# Patient Record
Sex: FEMALE | Race: WHITE | HISPANIC OR LATINO | Employment: FULL TIME | URBAN - METROPOLITAN AREA
[De-identification: names, ages, dates, MRNs, and addresses within clinical notes are randomized per-mention and may not be internally consistent; named-entity substitution may affect disease eponyms.]

---

## 2022-09-26 ENCOUNTER — OFFICE VISIT (OUTPATIENT)
Dept: INTERNAL MEDICINE | Facility: CLINIC | Age: 27
End: 2022-09-26
Payer: COMMERCIAL

## 2022-09-26 VITALS
DIASTOLIC BLOOD PRESSURE: 77 MMHG | SYSTOLIC BLOOD PRESSURE: 113 MMHG | BODY MASS INDEX: 32.3 KG/M2 | WEIGHT: 171.1 LBS | HEIGHT: 61 IN | OXYGEN SATURATION: 94 % | HEART RATE: 100 BPM

## 2022-09-26 DIAGNOSIS — G43.109 MIGRAINE WITH AURA AND WITHOUT STATUS MIGRAINOSUS, NOT INTRACTABLE: ICD-10-CM

## 2022-09-26 DIAGNOSIS — K21.9 GASTROESOPHAGEAL REFLUX DISEASE, UNSPECIFIED WHETHER ESOPHAGITIS PRESENT: ICD-10-CM

## 2022-09-26 DIAGNOSIS — F32.81 PMDD (PREMENSTRUAL DYSPHORIC DISORDER): ICD-10-CM

## 2022-09-26 DIAGNOSIS — F41.1 GAD (GENERALIZED ANXIETY DISORDER): Primary | ICD-10-CM

## 2022-09-26 DIAGNOSIS — F50.819 BINGE EATING DISORDER: ICD-10-CM

## 2022-09-26 PROCEDURE — 99204 OFFICE O/P NEW MOD 45 MIN: CPT | Performed by: INTERNAL MEDICINE

## 2022-09-26 RX ORDER — METRONIDAZOLE 7.5 MG/G
GEL VAGINAL
COMMUNITY
Start: 2022-02-01 | End: 2022-10-04

## 2022-09-26 RX ORDER — KETOPROFEN 25 MG/1
25 CAPSULE ORAL DAILY PRN
Qty: 30 CAPSULE | Refills: 1 | Status: SHIPPED | OUTPATIENT
Start: 2022-09-26 | End: 2022-09-27

## 2022-09-26 RX ORDER — VALACYCLOVIR HYDROCHLORIDE 500 MG/1
TABLET, FILM COATED ORAL
COMMUNITY
Start: 2022-02-15 | End: 2022-10-04

## 2022-09-26 RX ORDER — LAMOTRIGINE 50 MG/1
50 TABLET, EXTENDED RELEASE ORAL DAILY
COMMUNITY
End: 2022-10-04 | Stop reason: DRUGHIGH

## 2022-09-26 RX ORDER — MIRTAZAPINE 15 MG/1
TABLET, FILM COATED ORAL
COMMUNITY
Start: 2022-08-03 | End: 2022-09-26

## 2022-09-26 ASSESSMENT — ANXIETY QUESTIONNAIRES
5. BEING SO RESTLESS THAT IT IS HARD TO SIT STILL: NOT AT ALL
6. BECOMING EASILY ANNOYED OR IRRITABLE: NOT AT ALL
3. WORRYING TOO MUCH ABOUT DIFFERENT THINGS: SEVERAL DAYS
2. NOT BEING ABLE TO STOP OR CONTROL WORRYING: SEVERAL DAYS
GAD7 TOTAL SCORE: 3
IF YOU CHECKED OFF ANY PROBLEMS ON THIS QUESTIONNAIRE, HOW DIFFICULT HAVE THESE PROBLEMS MADE IT FOR YOU TO DO YOUR WORK, TAKE CARE OF THINGS AT HOME, OR GET ALONG WITH OTHER PEOPLE: NOT DIFFICULT AT ALL
GAD7 TOTAL SCORE: 3
1. FEELING NERVOUS, ANXIOUS, OR ON EDGE: SEVERAL DAYS
7. FEELING AFRAID AS IF SOMETHING AWFUL MIGHT HAPPEN: NOT AT ALL

## 2022-09-26 ASSESSMENT — ENCOUNTER SYMPTOMS
BLOATING: 1
PANIC: 0
DECREASED CONCENTRATION: 0
CONSTIPATION: 0
NAUSEA: 0
INSOMNIA: 0
RECTAL PAIN: 0
JAUNDICE: 0
VOMITING: 0
DEPRESSION: 0
ABDOMINAL PAIN: 0
NERVOUS/ANXIOUS: 1
BOWEL INCONTINENCE: 0
HEARTBURN: 0
DIARRHEA: 1
BLOOD IN STOOL: 0

## 2022-09-26 ASSESSMENT — PATIENT HEALTH QUESTIONNAIRE - PHQ9
SUM OF ALL RESPONSES TO PHQ QUESTIONS 1-9: 5
5. POOR APPETITE OR OVEREATING: NOT AT ALL

## 2022-09-26 NOTE — PROGRESS NOTES
"menta  {PROVIDER CHARTING PREFERENCE:863776}    Emma Marin is a 27 year old, presenting for the following health issues:  Establish Care (Pt left thumb nail was damaged about 2 months ago, pt would like it looked at. Pt would also like to discuss eating habits ), Headache, Nausea, and Anxiety      HPI     Left thumb nail injury:    Eating:           Anxiety:        {SUPERLIST (Optional):202200}  {additonal problems for provider to add (Optional):357513}    Review of Systems   {ROS COMP (Optional):172888}      Objective    /77 (BP Location: Right arm, Patient Position: Sitting, Cuff Size: Adult Regular)   Pulse 100   Ht 1.549 m (5' 1\")   Wt 77.6 kg (171 lb 1.6 oz)   LMP 09/12/2022 (Exact Date)   SpO2 94%   BMI 32.33 kg/m    Body mass index is 32.33 kg/m .  Physical Exam   {Exam List (Optional):887959}    {Diagnostic Test Results (Optional):632678}    {AMBULATORY ATTESTATION (Optional):549656}             "
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
## 2022-09-26 NOTE — NURSING NOTE
"Tomas Chris is a 27 year old female patient that presents today in clinic for the following:    Chief Complaint   Patient presents with     Establish Care     Pt left thumb nail was damaged about 2 months ago, pt would like it looked at. Pt would also like to discuss eating habits      Headache     Nausea     Anxiety     The patient's allergies and medications were reviewed as noted. A set of vitals were recorded as noted without incident: /77 (BP Location: Right arm, Patient Position: Sitting, Cuff Size: Adult Regular)   Pulse 100   Ht 1.549 m (5' 1\")   Wt 77.6 kg (171 lb 1.6 oz)   LMP 09/12/2022 (Exact Date)   SpO2 94%   BMI 32.33 kg/m  . The patient does not have any other questions for the provider.    Thelma Olivera, EMT at 4:32 PM on 9/26/2022    "

## 2022-09-27 DIAGNOSIS — G43.109 MIGRAINE WITH AURA AND WITHOUT STATUS MIGRAINOSUS, NOT INTRACTABLE: ICD-10-CM

## 2022-09-27 RX ORDER — KETOPROFEN 75 MG/1
75 CAPSULE ORAL DAILY PRN
Qty: 30 CAPSULE | Refills: 1 | Status: SHIPPED | OUTPATIENT
Start: 2022-09-27 | End: 2022-10-13

## 2022-09-27 NOTE — TELEPHONE ENCOUNTER
RN called patient and let her know Rx was sent to [facility] in [place].    Jaimie Balderas RN on 9/27/2022 at 5:05 PM

## 2022-09-27 NOTE — TELEPHONE ENCOUNTER
Prescription for the 75mg dose sent to [facility] since they have that in stock.    Thanks,  Jacky Muniz MD

## 2022-09-27 NOTE — TELEPHONE ENCOUNTER
[facility] called into the clinic.  They do not have ketoprofen on stock.  Unable to order.  Not sure if ketoprofen is still manufactured.  None available at any [facility].    Unable to reach provider yesterday via pager.          Gildardo Tamayo CMA ([facility]) at 1:36 PM on 9/27/2022

## 2022-10-03 ENCOUNTER — HEALTH MAINTENANCE LETTER (OUTPATIENT)
Age: 27
End: 2022-10-03

## 2022-10-04 ENCOUNTER — VIRTUAL VISIT (OUTPATIENT)
Dept: PSYCHIATRY | Facility: CLINIC | Age: 27
End: 2022-10-04
Attending: INTERNAL MEDICINE
Payer: COMMERCIAL

## 2022-10-04 DIAGNOSIS — F43.10 PTSD (POST-TRAUMATIC STRESS DISORDER): Primary | ICD-10-CM

## 2022-10-04 DIAGNOSIS — F32.81 PMDD (PREMENSTRUAL DYSPHORIC DISORDER): ICD-10-CM

## 2022-10-04 DIAGNOSIS — Z86.69 H/O MIGRAINE WITH AURA: ICD-10-CM

## 2022-10-04 PROCEDURE — 99205 OFFICE O/P NEW HI 60 MIN: CPT | Mod: 95 | Performed by: NURSE PRACTITIONER

## 2022-10-04 RX ORDER — MIRTAZAPINE 15 MG/1
TABLET, FILM COATED ORAL
COMMUNITY
Start: 2020-11-30 | End: 2022-10-04

## 2022-10-04 RX ORDER — LAMOTRIGINE 100 MG/1
100 TABLET ORAL DAILY
Qty: 30 TABLET | Refills: 1 | Status: SHIPPED | OUTPATIENT
Start: 2022-10-04 | End: 2023-02-16

## 2022-10-04 RX ORDER — MIRTAZAPINE 15 MG/1
7.5 TABLET, FILM COATED ORAL
Qty: 30 TABLET | Refills: 1 | Status: SHIPPED | OUTPATIENT
Start: 2022-10-04 | End: 2023-02-06

## 2022-10-04 RX ORDER — CLONIDINE HYDROCHLORIDE 0.1 MG/1
TABLET ORAL
Qty: 30 TABLET | Refills: 1 | Status: SHIPPED | OUTPATIENT
Start: 2022-10-04 | End: 2022-10-18 | Stop reason: SINTOL

## 2022-10-04 RX ORDER — LAMOTRIGINE 100 MG/1
TABLET ORAL
COMMUNITY
Start: 2022-09-14 | End: 2022-10-04

## 2022-10-04 ASSESSMENT — ENCOUNTER SYMPTOMS
EYES NEGATIVE: 1
NERVOUS/ANXIOUS: 1
MEMORY LOSS: 0
RESPIRATORY NEGATIVE: 1
HALLUCINATIONS: 0
INSOMNIA: 1
CARDIOVASCULAR NEGATIVE: 1
CONSTITUTIONAL NEGATIVE: 1
MUSCULOSKELETAL NEGATIVE: 1
DEPRESSION: 1
HEADACHES: 1
GASTROINTESTINAL NEGATIVE: 1

## 2022-10-04 ASSESSMENT — PATIENT HEALTH QUESTIONNAIRE - PHQ9
SUM OF ALL RESPONSES TO PHQ QUESTIONS 1-9: 6
SUM OF ALL RESPONSES TO PHQ QUESTIONS 1-9: 6
10. IF YOU CHECKED OFF ANY PROBLEMS, HOW DIFFICULT HAVE THESE PROBLEMS MADE IT FOR YOU TO DO YOUR WORK, TAKE CARE OF THINGS AT HOME, OR GET ALONG WITH OTHER PEOPLE: NOT DIFFICULT AT ALL

## 2022-10-04 ASSESSMENT — LIFESTYLE VARIABLES: SUBSTANCE_ABUSE: 0

## 2022-10-04 NOTE — Clinical Note
Dr. Muniz,  Thank you for referring Tomas to our [facility] ([facility]).   Patient attended her intake today.  The goal of [facility] is to be seen for approximately 3-5 visits to optimize medications for psychiatric symptoms before returning to your care for ongoing management of mental health.  If long-term psychiatric services are deemed more appropriate, a referral can be placed at that time.   Please see today's intake for my diagnostic impression and plan and let me know if you have any questions or concerns.  I will update you and the patient once Tomas is discharged from [facility].    Harpal,  Ute Muniz, APRN, CNP, PNP-PC, PMHNP-BC  [facility] ([facility])

## 2022-10-04 NOTE — Clinical Note
Please call patient to schedule followup appointment with me in 2 weeks.   Thank you,  Ute Muniz, APRN, CNP, PNP-PC, PMHNP-BC  [facility] ([facility])
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
## 2022-10-06 ENCOUNTER — TELEPHONE (OUTPATIENT)
Dept: PSYCHIATRY | Facility: CLINIC | Age: 27
End: 2022-10-06

## 2022-10-06 ENCOUNTER — TELEPHONE (OUTPATIENT)
Dept: INTERNAL MEDICINE | Facility: CLINIC | Age: 27
End: 2022-10-06

## 2022-10-07 NOTE — TELEPHONE ENCOUNTER
[employer] message sent to patient to ask if she has tried other meds in the past.    Jacky Muniz MD

## 2022-10-18 ENCOUNTER — VIRTUAL VISIT (OUTPATIENT)
Dept: PSYCHIATRY | Facility: CLINIC | Age: 27
End: 2022-10-18
Payer: COMMERCIAL

## 2022-10-18 ENCOUNTER — VIRTUAL VISIT (OUTPATIENT)
Dept: BEHAVIORAL HEALTH | Facility: CLINIC | Age: 27
End: 2022-10-18
Payer: COMMERCIAL

## 2022-10-18 DIAGNOSIS — F32.81 PMDD (PREMENSTRUAL DYSPHORIC DISORDER): ICD-10-CM

## 2022-10-18 DIAGNOSIS — F43.10 PTSD (POST-TRAUMATIC STRESS DISORDER): Primary | ICD-10-CM

## 2022-10-18 PROCEDURE — 99214 OFFICE O/P EST MOD 30 MIN: CPT | Mod: 95 | Performed by: NURSE PRACTITIONER

## 2022-10-18 PROCEDURE — 90832 PSYTX W PT 30 MINUTES: CPT | Mod: 95 | Performed by: COUNSELOR

## 2022-10-18 ASSESSMENT — ANXIETY QUESTIONNAIRES
GAD7 TOTAL SCORE: 2
7. FEELING AFRAID AS IF SOMETHING AWFUL MIGHT HAPPEN: NOT AT ALL
GAD7 TOTAL SCORE: 2
2. NOT BEING ABLE TO STOP OR CONTROL WORRYING: NOT AT ALL
3. WORRYING TOO MUCH ABOUT DIFFERENT THINGS: NOT AT ALL
7. FEELING AFRAID AS IF SOMETHING AWFUL MIGHT HAPPEN: NOT AT ALL
1. FEELING NERVOUS, ANXIOUS, OR ON EDGE: NOT AT ALL
8. IF YOU CHECKED OFF ANY PROBLEMS, HOW DIFFICULT HAVE THESE MADE IT FOR YOU TO DO YOUR WORK, TAKE CARE OF THINGS AT HOME, OR GET ALONG WITH OTHER PEOPLE?: VERY DIFFICULT
5. BEING SO RESTLESS THAT IT IS HARD TO SIT STILL: NOT AT ALL
4. TROUBLE RELAXING: MORE THAN HALF THE DAYS
6. BECOMING EASILY ANNOYED OR IRRITABLE: NOT AT ALL
IF YOU CHECKED OFF ANY PROBLEMS ON THIS QUESTIONNAIRE, HOW DIFFICULT HAVE THESE PROBLEMS MADE IT FOR YOU TO DO YOUR WORK, TAKE CARE OF THINGS AT HOME, OR GET ALONG WITH OTHER PEOPLE: VERY DIFFICULT
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
## 2022-10-18 NOTE — Clinical Note
Please call patient to schedule followup appointment with me in 4 weeks.    Thank you,   Ute Muniz, APRN, CNP, PNP-PC, PMHNP-BC  [facility] ([facility])

## 2022-10-18 NOTE — PROGRESS NOTES
[facility]  2022      Behavioral Health Clinician Progress Note    Patient Name: Tomas Chris           Service Type:  Individual      Service Location:   MyChart / Email (patient reached)     Session Start Time: 231pm  Session End Time: 256pm      Session Length: 16 - 37      Attendees: Patient     Service Modality:  Video Visit:      Provider verified identity through the following two step process.  Patient provided:  Patient  and Patient was verified at admission/transfer    Telemedicine Visit: The patient's condition can be safely assessed and treated via synchronous audio and visual telemedicine encounter.      Reason for Telemedicine Visit: Services only offered telehealth    Originating Site (Patient Location): Patient's home    Distant Site (Provider Location): Provider Remote Setting- Home Office    Consent:  The patient/guardian has verbally consented to: the potential risks and benefits of telemedicine (video visit) versus in person care; bill my insurance or make self-payment for services provided; and responsibility for payment of non-covered services.     Patient would like the video invitation sent by:  My Chart    Mode of Communication:  Video Conference via [facility]    Distant Location (Provider):  Off-site    As the provider I attest to compliance with applicable laws and regulations related to telemedicine.    Visit Activities (Refresh list every visit): [facility] Only    Diagnostic Assessment Date: 10/4/2022 Ute Muniz  Treatment Plan Review Date: 2022  See Flowsheets for today's PHQ-9 and JESUS-7 results  Previous PHQ-9:   PHQ-9 SCORE 2022 10/4/2022   PHQ-9 Total Score MyChart - 6 (Mild depression)   PHQ-9 Total Score 5 6     Previous JESUS-7:   JESUS-7 SCORE 2022 10/18/2022   Total Score - 2 (minimal anxiety)   Total Score 3 2       TRAE LEVEL:  No flowsheet data found.    DATA  Extended Session (60+ minutes):  No  Interactive Complexity: No  Crisis: No  [facility] Patient: No    Treatment Objective(s) Addressed in This Session:  Target Behavior(s): disease management/lifestyle changes trouble with sleep and low energy, and worry about family    Depressed Mood: Decrease frequency and intensity of feeling down, depressed, hopeless  Improve quantity and quality of night time sleep / decrease daytime naps  Feel less tired and more energy during the day   Improve diet, appetite, mindful eating, and / or meal planning  Improve concentration, focus, and mindfulness in daily activities   Anxiety: will experience a reduction in anxiety, will develop healthy cognitive patterns and beliefs and will increase ability to function adaptively  Psychological distress related to Sleep Disturbance    Current Stressors / Issues:   update: Pt reports that they started 100 mg of Lamotrigine and Clonidine. Pt was just started new medication and they took it throughout this time period. Pt has has insomnia as a child. It has become worse and pt has tried exercising or just not walking. Pt tried hot showers before bed and use lavender oil and reduce screen time, wear ear plugs and read before bed. Pt is getting 8 hours of sleep a week. Pt did not sleep at all yesterday. Took the lamotrigine in the 10am and didn't take the clonidine and were able to sleep better, this could be related to not getting sleep for a few nights. Pt missed work recently. Still miss parents.   Stressors: none  Side Effects: sleep is evasive, dry mouth and have Aisha things stuck to their lip, have to wake up to drink water- Clonidine   Mood: yesterday feel exhausted and had a melt down, cried told  they couldn't do it anymore   Appetite: haven't been eating a lot, been ordering a lot of take out since pt doesn't have the energy to cook   Sleep: almost been a month since they were able to sleep   Pregnancy: No  Impulsive spending/spending sprees: none  Suicidality: Pt  denies   Self-harm: Pt denies   Substance Use: Pt denies   Caffeine: Pt denies   Therapist: meeting with therapist this week and she is outside of [place] and is a massage psychologist    Interventions: [facility] recommends pt to ask questions about therapists experience and recommendations   Medication Questions/Requests: sleep, concern about things that trigger migraines, concern is lamotrigine can cause insomnia         Progress on Treatment Objective(s) / Homework:  Minimal progress - ACTION (Actively working towards change); Intervened by reinforcing change plan / affirming steps taken    Motivational Interviewing    MI Intervention: Co-Developed Goal: reduce anxiety and improve sleep, Expressed Empathy/Understanding, Supported Autonomy, Collaboration, Evocation, Permission to raise concern or advise, Open-ended questions, Reflections: simple and complex, Change talk (evoked) and Reframe     Change Talk Expressed by the Patient: Need to change Committment to change Taking steps    Provider Response to Change Talk: E - Evoked more info from patient about behavior change, A - Affirmed patient's thoughts, decisions, or attempts at behavior change, R - Reflected patient's change talk and S - Summarized patient's change talk statements    Also provided psychoeducation about behavioral health condition, symptoms, and treatment options    Care Plan review completed: No    Medication Review:  No changes to current psychiatric medication(s)    Medication Compliance:  Yes    Changes in Health Issues:   None reported    Chemical Use Review:   Substance Use: Chemical use reviewed, no active concerns identified      Tobacco Use: No current tobacco use.      Assessment: Current Emotional / Mental Status (status of significant symptoms):  Risk status (Self / Other harm or suicidal ideation)  Patient denies a history of suicidal ideation, suicide attempts, self-injurious behavior, homicidal ideation, homicidal behavior and and  other safety concerns  Patient denies current fears or concerns for personal safety.  Patient denies current or recent suicidal ideation or behaviors.  Patient denies current or recent homicidal ideation or behaviors.  Patient denies current or recent self injurious behavior or ideation.  Patient denies other safety concerns.  A safety and risk management plan has not been developed at this time, however patient was encouraged to call [place] [phone] should there be a change in any of these risk factors.    Appearance:   Appropriate   Eye Contact:   Good   Psychomotor Behavior: Normal   Attitude:   Cooperative  Interested Friendly  Orientation:   All  Speech   Rate / Production: Normal    Volume:  Normal   Mood:    Anxious  Sad   Affect:    Subdued  Worrisome   Thought Content:  Clear   Thought Form:  Coherent  Logical   Insight:    Good     Diagnoses:  1. PTSD (post-traumatic stress disorder)    2. PMDD (premenstrual dysphoric disorder)        Collateral Reports Completed:  Communicated with:   Ute Muniz, APRN, CNP, PNP-PC, PMHNP-BC     Plan: (Homework, other):  Patient was given information about behavioral services and encouraged to schedule a follow up appointment with the clinic [facility] [place] in 1 month.  She was also given information about mental health symptoms and treatment options .  CD Recommendations: No indications of CD issues.    EFRAIN Sharpe, [facility] 10/18/2022  ______________________________________________________________________    Integrated Primary Care Behavioral Health Treatment Plan    Patient's Name: Tomas Chris  YOB: 1995    Date of Creation: 10/18/2022  Date Treatment Plan Last Reviewed/Revised: 10/18/2022    DSM5 Diagnoses:   1. PTSD (post-traumatic stress disorder)    2. PMDD (premenstrual dysphoric disorder)      Psychosocial / Contextual Factors: works, parents and friends are still in [facility]  PROMIS (reviewed every 90 days):   PROMIS 10 [facility]  Score Global Physical Health Score   10/4/2022 13 15     PROMIS 10 PROMIS TOTAL - SUBSCORES   10/4/2022 28       Referral / Collaboration:  Referral to another professional/service is not indicated at this time. Pt is going to meet with a therapist soon and will report back about the connection and fit.     Anticipated number of session for this episode of care: 5-6  Anticipation frequency of session: Monthly  Anticipated Duration of each session: 16-37 minutes  Treatment plan will be reviewed in 90 days or when goals have been changed.       MeasurableTreatment Goal(s) related to diagnosis / functional impairment(s)  Goal 1: Patient will reduce anxiety and PTSD symptoms.     I will know I've met my goal when I am hopeful that I can find a therapist who will be right for me.      Objective #A (Patient Action)    Patient will find a therapist who is a good match and fit and will report this to [facility] .    Status: New - Date: 10/18/2022     Intervention(s)  Therapist will provide support and resources/future referrals as needed to locate a therapist who will be a good match personality wise and the theoretical orientation pt is looking for.     Patient has reviewed and agreed to the above plan.      Bernie Worthington, ANDREY  October 18, 2022

## 2023-01-06 ENCOUNTER — HOSPITAL ENCOUNTER (OUTPATIENT)
Dept: GENERAL RADIOLOGY | Facility: HOSPITAL | Age: 28
Discharge: HOME | End: 2023-01-06
Attending: STUDENT IN AN ORGANIZED HEALTH CARE EDUCATION/TRAINING PROGRAM | Admitting: STUDENT IN AN ORGANIZED HEALTH CARE EDUCATION/TRAINING PROGRAM
Payer: COMMERCIAL

## 2023-01-06 ENCOUNTER — OFFICE VISIT (OUTPATIENT)
Dept: FAMILY MEDICINE | Facility: CLINIC | Age: 28
End: 2023-01-06
Payer: COMMERCIAL

## 2023-01-06 VITALS
WEIGHT: 169.7 LBS | TEMPERATURE: 98.1 F | OXYGEN SATURATION: 99 % | DIASTOLIC BLOOD PRESSURE: 81 MMHG | BODY MASS INDEX: 32.06 KG/M2 | RESPIRATION RATE: 16 BRPM | SYSTOLIC BLOOD PRESSURE: 117 MMHG | HEART RATE: 81 BPM

## 2023-01-06 DIAGNOSIS — R07.81 PLEURITIC CHEST PAIN: ICD-10-CM

## 2023-01-06 DIAGNOSIS — M94.0 COSTOCHONDRITIS: ICD-10-CM

## 2023-01-06 DIAGNOSIS — R05.8 POST-VIRAL COUGH SYNDROME: Primary | ICD-10-CM

## 2023-01-06 LAB
ATRIAL RATE - MUSE: 84 BPM
DIASTOLIC BLOOD PRESSURE - MUSE: NORMAL MMHG
INTERPRETATION ECG - MUSE: NORMAL
P AXIS - MUSE: 14 DEGREES
PR INTERVAL - MUSE: 142 MS
QRS DURATION - MUSE: 100 MS
QT - MUSE: 398 MS
QTC - MUSE: 470 MS
R AXIS - MUSE: 4 DEGREES
SYSTOLIC BLOOD PRESSURE - MUSE: NORMAL MMHG
T AXIS - MUSE: 23 DEGREES
VENTRICULAR RATE- MUSE: 84 BPM

## 2023-01-06 PROCEDURE — 93005 ELECTROCARDIOGRAM TRACING: CPT | Performed by: STUDENT IN AN ORGANIZED HEALTH CARE EDUCATION/TRAINING PROGRAM

## 2023-01-06 PROCEDURE — 99213 OFFICE O/P EST LOW 20 MIN: CPT | Performed by: STUDENT IN AN ORGANIZED HEALTH CARE EDUCATION/TRAINING PROGRAM

## 2023-01-06 PROCEDURE — 93010 ELECTROCARDIOGRAM REPORT: CPT | Performed by: INTERNAL MEDICINE

## 2023-01-06 PROCEDURE — 71046 X-RAY EXAM CHEST 2 VIEWS: CPT

## 2023-01-06 RX ORDER — BENZONATATE 100 MG/1
100 CAPSULE ORAL 3 TIMES DAILY PRN
Qty: 30 CAPSULE | Refills: 0 | Status: SHIPPED | OUTPATIENT
Start: 2023-01-06 | End: 2023-01-16

## 2023-01-06 RX ORDER — ECHINACEA PURPUREA EXTRACT 125 MG
2 TABLET ORAL 3 TIMES DAILY PRN
Qty: 88 ML | Refills: 1 | Status: SHIPPED | OUTPATIENT
Start: 2023-01-06

## 2023-01-06 NOTE — PROGRESS NOTES
URGENT CARE VISIT:    SUBJECTIVE:   Tomas Chris is a 27 year old female presenting with a chief complaint of URI symptoms.  After Thanksgiving, she reports of generalized body aches, dry cough, chills, difficulty breathing when laying flat.   She tried Mucinex with 1.5 weeks with no improvement.   During first week of December, she reports of brown sticky phlegm.  She went to [place], [place], and [place] from December 25 to January 5.   She took 4 COVID tests and were negative.   Last week, she reports of decreased energy, sore throat.   She denies the following symptoms: fever  Course of illness is same.    Treatment measures tried include Tylenol/Ibuprofen, Fluids, Rest and Mucinex with no relief of symptoms.  Predisposing factors include None.  She reports of decreased appetite. She is voiding normally.  [place] was [place] She was raised in [facility]. [place].  She denies any exposure to anyone who tested positive for COVID, tuberculosis.    PMH:   Past Medical History:   Diagnosis Date     Episode of syncope      Allergies: Penicillins   Medications:   Current Outpatient Medications   Medication Sig Dispense Refill     benzonatate (TESSALON) 100 MG capsule Take 1 capsule (100 mg) by mouth 3 times daily as needed for cough 30 capsule 0     lamoTRIgine (LAMICTAL) 100 MG tablet Take 1 tablet (100 mg) by mouth daily 30 tablet 1     mirtazapine (REMERON) 15 MG tablet Take 0.5 tablets (7.5 mg) by mouth nightly as needed (sleep) 30 tablet 1     rizatriptan (MAXALT-MLT) 5 MG ODT Take 1 tablet (5 mg) by mouth at onset of headache for migraine May repeat in 2 hours. Max 6 tablets/24 hours. 18 tablet 3     sodium chloride (OCEAN) 0.65 % nasal spray Spray 2 sprays in nostril 3 times daily as needed for congestion 88 mL 1     Social History:   Social History     Tobacco Use     Smoking status: Never     Smokeless tobacco: Never   Substance Use Topics     Alcohol use: Not Currently     Comment:  "Drinks \"maybe a beer\" when they go out, but nothing excessive. Does not keep alcohol in the home.       ROS:  Review of systems negative except as stated above.    OBJECTIVE:  /81 (BP Location: Left arm, Patient Position: Sitting, Cuff Size: Adult Large)   Pulse 81   Temp 98.1  F (36.7  C) (Oral)   Resp 16   Wt 77 kg (169 lb 11.2 oz)   SpO2 99%   BMI 32.06 kg/m    GENERAL APPEARANCE: healthy, alert and no distress  EYES: EOMI,  PERRL, conjunctiva clear  HENT: ear canals and TM's normal.  Nose and mouth without ulcers, erythema or lesions  NECK: supple, nontender, no lymphadenopathy  RESP: lungs clear to auscultation - no rales, rhonchi or wheezes  CV: regular rates and rhythm, normal S1 S2, no murmur noted  CHEST: anterior chest wall pain is reproducible to palpation  SKIN: no suspicious lesions or rashes    Labs:    Results for orders placed or performed during the hospital encounter of 01/06/23   XR Chest 2 Views     Status: None    Narrative    EXAM: XR CHEST 2 VIEWS  LOCATION: [facility]  DATE/TIME: 1/6/2023 2:02 PM    INDICATION:  Pleuritic chest pain  COMPARISON: None.      Impression    IMPRESSION: Lungs are clear. No pleural effusion. No pneumothorax. Heart size and pulmonary vascularity within normal limits.   Results for orders placed or performed in visit on 01/06/23   EKG 12-lead, tracing only     Status: None   Result Value Ref Range    Systolic Blood Pressure  mmHg    Diastolic Blood Pressure  mmHg    Ventricular Rate 84 BPM    Atrial Rate 84 BPM    NE Interval 142 ms    QRS Duration 100 ms     ms    QTc 470 ms    P Axis 14 degrees    R AXIS 4 degrees    T Axis 23 degrees    Interpretation ECG       Sinus rhythm  Normal ECG  No previous ECGs available  Confirmed by SHELDON STERLING MD LOC:[facility] ([id number]) on 1/6/2023 2:11:43 PM         ASSESSMENT/PLAN:    ICD-10-CM    1. Post-viral cough syndrome  R05.8 sodium chloride (OCEAN) 0.65 % nasal spray     benzonatate " (TESSALON) 100 MG capsule      2. Pleuritic chest pain  R07.81 XR Chest 2 Views     EKG 12-lead, tracing only     CANCELED: EKG 12-lead complete w/read - Clinics      3. Costochondritis  M94.0       Etiology likely post viral cough with costochondritis. On exam, afebrile, hemodynamically stable on RA. On exam, chest wall pain reproducible to palpation otherwise cardiopulmonary exam normal. ECG and CXR normal. Discussed conservative management with fluids, nasal saline irrigation (neti pot), warm steamy shower, salt water gargles, cough suppressants, expectorants (Mucinex), decongestants (Pseudofed), and analgesics (Tylenol and/or Ibuprofen).    Patient verbalized understanding and is agreeable to plan. The patient was discharged ambulatory and in stable condition.    Shanta Rm MD ....................  1/6/2023   1:22 PM

## 2023-01-06 NOTE — PATIENT INSTRUCTIONS
Patient was educated on the natural course of post viral cough. Conservative measures discussed including increased fluids, nasal saline irrigation (neti pot), warm steamy shower, salt water gargles, cough suppressants, expectorants (Mucinex), decongestants (Pseudofed), and analgesics (Tylenol and/or Ibuprofen). See your primary care provider if symptoms worsen or do not improve in 5 days. Seek emergency care if you develop fever over 104 or shortness of breath.

## 2023-01-12 ENCOUNTER — OFFICE VISIT (OUTPATIENT)
Dept: INTERNAL MEDICINE | Facility: CLINIC | Age: 28
End: 2023-01-12
Payer: COMMERCIAL

## 2023-01-12 VITALS
DIASTOLIC BLOOD PRESSURE: 81 MMHG | OXYGEN SATURATION: 96 % | HEART RATE: 87 BPM | SYSTOLIC BLOOD PRESSURE: 121 MMHG | WEIGHT: 173.6 LBS | BODY MASS INDEX: 32.8 KG/M2 | TEMPERATURE: 98.2 F

## 2023-01-12 DIAGNOSIS — R05.8 POST-VIRAL COUGH SYNDROME: Primary | ICD-10-CM

## 2023-01-12 PROCEDURE — 99213 OFFICE O/P EST LOW 20 MIN: CPT | Mod: GC

## 2023-01-12 NOTE — PATIENT INSTRUCTIONS
your tessalon and start taking at night. Continue decongestants, nasal spray. Try a Neti-Pot to clear out congestion. Likely cause of cough is post-nasal drip.     If shortness of breath worsens, worsening chest pain, symptoms not resolving in 2-4 weeks, please return to clinic or let us know.

## 2023-01-12 NOTE — NURSING NOTE
Tomas Chris is a 27 year old female patient that presents today in clinic for the following:    Chief Complaint   Patient presents with     Chest Pain     Cough for over 5 weeks, chest pain and congestion per pt, not sleeping well due to coughing, dry cough      The patient's allergies and medications were reviewed as noted. A set of vitals were recorded as noted without incident: /81 (BP Location: Right arm, Patient Position: Sitting, Cuff Size: Adult Regular)   Pulse 87   Temp 98.2  F (36.8  C)   Wt 78.7 kg (173 lb 9.6 oz)   LMP 12/28/2022 (Approximate)   SpO2 96%   BMI 32.80 kg/m  . The patient does not have any other questions for the provider.    Thelma Olivera, EMT at 7:45 AM on 1/12/2023

## 2023-01-12 NOTE — PROGRESS NOTES
I, Clement Hong MD discussed with the resident during the visit, and agree with the resident's findings and plan of care as documented in the resident's note. I was immediately available to the patient should the need have arisen.  /81 (BP Location: Right arm, Patient Position: Sitting, Cuff Size: Adult Regular)   Pulse 87   Temp 98.2  F (36.8  C)   Wt 78.7 kg (173 lb 9.6 oz)   LMP 12/28/2022 (Approximate)   SpO2 96%   BMI 32.80 kg/m    I personally reviewed vital signs and past record.  Key findings: cough, recurrent - may be second episode. Rev'd CXR. Tender on sternal palpation.

## 2023-01-12 NOTE — PROGRESS NOTES
PRIMARY CARE CENTER       SUBJECTIVE:  Tomas Chris is a 27 year old female with a PMHx of anxiety, PTSD who comes in for cough.     She notes initial URI symptoms 11/27 with cough, congestion, body aches, chills.  She did take Augmentin for it.  After several weeks, symptoms improved.  She then had a 2-week trip to [place] and started feeling poorly 5 days after a trip, around 12/29.  She has taken many at home COVID test that have turned negative.  She notes that she has had brown, sticky phlegm and congestion.  Went to a clinic last week at which time chest x-ray and EKG was unremarkable.  She has tried saline spray nasal spray, Atrovent nebulizer from her  2-3 times a day without significant improvement, no fevers, chills, body aches, weight loss, night sweats, other allergy symptoms, tobacco use, vaping, other inhalants.  She feels that her coughing is due to her postnasal drip.  Does have chest pain associated with coughing located in the middle of her chest without radiation.  Has not tried Sudafed or [place] pot recommended last visit.    Family history of lung disease includes grandfather who had lung infection possible cancer.     Medications and allergies reviewed by me today.     ROS:   Full ROS negative unless mentioned in HPI.     OBJECTIVE:    /81 (BP Location: Right arm, Patient Position: Sitting, Cuff Size: Adult Regular)   Pulse 87   Temp 98.2  F (36.8  C)   Wt 78.7 kg (173 lb 9.6 oz)   LMP 12/28/2022 (Approximate)   SpO2 96%   BMI 32.80 kg/m     Wt Readings from Last 1 Encounters:   01/12/23 78.7 kg (173 lb 9.6 oz)       GENERAL APPEARANCE: healthy, alert and no distress     EYES: EOMI     HENT: inflamed turbinates bilateral, post-nasal drip noted in throat. No sinus tenderness      NECK: no adenopathy     RESP: lungs clear to auscultation - no rales, rhonchi or wheezes     CV: regular rates and rhythm, normal S1 S2, no S3 or S4 and no murmur, click or  rub     CHEST: anterior chest wall pain reproducible to palpation     ABDOMEN:  soft, non-distended     MS: extremities normal- no gross deformities noted     SKIN: no suspicious lesions or rashes on exposed skin     ASSESSMENT/PLAN:    Tomas was seen today for chest pain.    Diagnoses and all orders for this visit:    Post-viral cough syndrome vs. Acute bronchitis  Agree the etiology is still most likely postviral cough with costochondritis versus possible acute bronchitis with this recent exacerbation.  EKG and chest x-ray at last visit were unremarkable.  Given lack of systemic symptoms, hemodynamically stable, recommends continued conservative management, including using a [place] pot, decongestions like Sudafed, Tessalon at night.    Tomas verbalized understanding is agreeable to plan.  She should follow-up in clinic with me or her PCP in 1 month if symptoms continue to worsen.     Darrell Chávez MD  Jan 12, 2023    Pt was seen and plan of care discussed with Dr. Hong.

## 2023-02-03 DIAGNOSIS — F43.10 PTSD (POST-TRAUMATIC STRESS DISORDER): ICD-10-CM

## 2023-02-06 ENCOUNTER — MYC MEDICAL ADVICE (OUTPATIENT)
Dept: PSYCHIATRY | Facility: CLINIC | Age: 28
End: 2023-02-06
Payer: COMMERCIAL

## 2023-02-06 RX ORDER — MIRTAZAPINE 15 MG/1
TABLET, FILM COATED ORAL
Qty: 30 TABLET | Refills: 1 | Status: SHIPPED | OUTPATIENT
Start: 2023-02-06 | End: 2023-02-16

## 2023-02-06 NOTE — TELEPHONE ENCOUNTER
Date of Last Office Visit: 10/18/22  Date of Next Office Visit: None, will contact patient and A  No shows since last visit: 0  Cancellations since last visit: 11/18/22    Medication requested: mirtazapine (REMERON) 15 MG tablet Date last ordered: 10/4/22 Qty: 30 Refills: 0     Review of [place] ?: NA    Lapse in medication adherence greater than 5 days?: No  If yes, call patient and gather details: na  Medication refill request verified as identical to current order?: yes  Result of Last DAM, VPA, Li+ Level, CBC, or Carbamazepine Level (at or since last visit): N/A      Last visit treatment plan:     CONTINUE mirtazapine 7.5 mg at bedtime but okay to use only if needed for sleep.  If sleep improves, okay to stop mirtazapine altogether.    STOP clonidine due to increased insomnia and dry mouth.     CONTINUE lamotrigine 100 mg daily     Labs:  None placed today.  Consider:  CBC, TSH with free T4, and CMP    Recommend therapy.  Follow-up:  4 weeks or sooner if new or worsening symptoms    []Medication refilled per  Medication Refill in Ambulatory Care  policy.  [x]Medication unable to be refilled by RN due to criteria not met as indicated below:    []Eligibility - not seen in the last year   [x]Supervision - no future appointment   [x]Compliance - no shows, cancellations or lapse in therapy   []Verification - order discrepancy   []Controlled medication   [x]Medication not included in policy   []90-day supply request   []Other

## 2023-02-15 NOTE — PROGRESS NOTES
[facility] Care Psychiatry Services [facility]  2023      Behavioral Health Clinician Progress Note    Patient Name: Tomas Chris           Service Type:  Individual      Service Location:   MyChart / Email (patient reached)     Session Start Time: 200pm  Session End Time: 216pm      Session Length: 16 - 37      Attendees: Patient     Service Modality:  Video Visit:      Provider verified identity through the following two step process.  Patient provided:  Patient  and Patient was verified at admission/transfer    Telemedicine Visit: The patient's condition can be safely assessed and treated via synchronous audio and visual telemedicine encounter.      Reason for Telemedicine Visit: Services only offered telehealth    Originating Site (Patient Location): Patient's home    Distant Site (Provider Location): Provider Remote Setting- Home Office    Consent:  The patient/guardian has verbally consented to: the potential risks and benefits of telemedicine (video visit) versus in person care; bill my insurance or make self-payment for services provided; and responsibility for payment of non-covered services.     Patient would like the video invitation sent by:  My Chart    Mode of Communication:  Video Conference via [facility]    Distant Location (Provider):  Off-site    As the provider I attest to compliance with applicable laws and regulations related to telemedicine.    Visit Activities (Refresh list every visit): [facility] Only    Diagnostic Assessment Date: 10/4/2022 Ute Muniz  Treatment Plan Review Date: 2023  See Flowsheets for today's PHQ-9 and JESUS-7 results  Previous PHQ-9:   PHQ-9 SCORE 2022 10/4/2022   PHQ-9 Total Score MyChart - 6 (Mild depression)   PHQ-9 Total Score 5 6     Previous JESUS-7:   JESUS-7 SCORE 2022 10/18/2022   Total Score - 2 (minimal anxiety)   Total Score 3 2       TRAE LEVEL:  No flowsheet data found.    DATA  Extended Session (60+ minutes):  No  Interactive Complexity: No  Crisis: No  [facility] Patient: No    Treatment Objective(s) Addressed in This Session:  Target Behavior(s): disease management/lifestyle changes trouble with sleep and low energy, and worry about family    Depressed Mood: Decrease frequency and intensity of feeling down, depressed, hopeless  Improve quantity and quality of night time sleep / decrease daytime naps  Feel less tired and more energy during the day   Improve diet, appetite, mindful eating, and / or meal planning  Improve concentration, focus, and mindfulness in daily activities   Anxiety: will experience a reduction in anxiety, will develop healthy cognitive patterns and beliefs and will increase ability to function adaptively  Psychological distress related to Sleep Disturbance    Current Stressors / Issues:  MH update: Pt stopped lamictal altogether because of difficulties with sleep so she was advised to stop.  Despite increased stress she's been sleeping well. Her anxiety is effecting her GI. Feels that she is managing her anxiety better. Skills: deep breathing, lists, organizing, planning. Her family lives in [place] and she worries a lot about their safety. She's moving to [place] in March and hopes to be able to bring her family from [place] to [place]. Moving to [place] will also make it easier for her to see family.   Stresses: visiting family in [place], moving to [place], looking for a job  Appetite: pt reports that she's not eating gluten and is making healthier food choices  Sleep: unremarkable  Therapy: She will pursue when she moves   LIGIA: No  Preg: No  Most important: med update, mirtazapine working well, worries about placebo effect (doesn't sleep when she doesn't take it because it's such a small dose)    Progress on Treatment Objective(s) / Homework:  Stable - ACTION (Actively working towards change); Intervened by reinforcing change plan / affirming steps taken    Motivational Interviewing    MI Intervention: Co-Developed  Goal: reduce anxiety and improve sleep, Expressed Empathy/Understanding, Supported Autonomy, Collaboration, Evocation, Permission to raise concern or advise, Open-ended questions, Reflections: simple and complex, Change talk (evoked) and Reframe     Change Talk Expressed by the Patient: Need to change Committment to change Taking steps    Provider Response to Change Talk: E - Evoked more info from patient about behavior change, A - Affirmed patient's thoughts, decisions, or attempts at behavior change, R - Reflected patient's change talk and S - Summarized patient's change talk statements    Also provided psychoeducation about behavioral health condition, symptoms, and treatment options    Care Plan review completed: No    Medication Review:  No changes to current psychiatric medication(s)    Medication Compliance:  Yes    Changes in Health Issues:   None reported    Chemical Use Review:   Substance Use: Chemical use reviewed, no active concerns identified      Tobacco Use: No current tobacco use.      Assessment: Current Emotional / Mental Status (status of significant symptoms):  Risk status (Self / Other harm or suicidal ideation)  Patient denies a history of suicidal ideation, suicide attempts, self-injurious behavior, homicidal ideation, homicidal behavior and and other safety concerns  Patient denies current fears or concerns for personal safety.  Patient denies current or recent suicidal ideation or behaviors.  Patient denies current or recent homicidal ideation or behaviors.  Patient denies current or recent self injurious behavior or ideation.  Patient denies other safety concerns.  A safety and risk management plan has not been developed at this time, however patient was encouraged to call [facility] should there be a change in any of these risk factors.    Appearance:   Appropriate   Eye Contact:   Good   Psychomotor Behavior: Normal   Attitude:   Cooperative  Interested  Friendly  Orientation:   All  Speech   Rate / Production: Normal    Volume:  Normal   Mood:    Normal  Affect:    Appropriate   Thought Content:  Clear   Thought Form:  Coherent  Logical   Insight:    Good     Diagnoses:  1. PTSD (post-traumatic stress disorder)        Collateral Reports Completed:  Communicated with:   Ute Muniz, APRN, CNP, PNP-PC, PMHNP-BC     Plan: (Homework, other):  Patient was given information about behavioral services and encouraged to schedule a follow up appointment with the clinic [facility] in 1 month.  She was also given information about mental health symptoms and treatment options .  CD Recommendations: No indications of CD issues.    Shreya Whyte [facility]    ______________________________________________________________________    Integrated Primary Care Behavioral Health Treatment Plan    Patient's Name: Tomas Chris  YOB: 1995    Date of Creation: 10/18/2022  Date Treatment Plan Last Reviewed/Revised: 1/18/2023    DSM5 Diagnoses:   1. PTSD (post-traumatic stress disorder)    2. PMDD (premenstrual dysphoric disorder)      Psychosocial / Contextual Factors: works, parents and friends are still in [place]  PROMIS (reviewed every 90 days):   PROMIS 10 Global Mental Health Score Global Physical Health Score   10/4/2022 13 15     PROMIS 10 PROMIS TOTAL - SUBSCORES   10/4/2022 28       Referral / Collaboration:  Referral to another professional/service is not indicated at this time. Pt is going to meet with a therapist soon and will report back about the connection and fit.     Anticipated number of session for this episode of care: 5-6  Anticipation frequency of session: Monthly  Anticipated Duration of each session: 16-37 minutes  Treatment plan will be reviewed in 90 days or when goals have been changed.       MeasurableTreatment Goal(s) related to diagnosis / functional impairment(s)  Goal 1: Patient will reduce anxiety and PTSD symptoms.     I will know I've met  my goal when I am hopeful that I can find a therapist who will be right for me.      Objective #A (Patient Action)    Patient will find a therapist who is a good match and fit and will report this to [facility] .    Status: Continued - Date(s):1/18/2023     Intervention(s)  Therapist will provide support and resources/future referrals as needed to locate a therapist who will be a good match personality wise and the theoretical orientation pt is looking for.     Patient has reviewed and agreed to the above plan.      Shreya Whyte, MaineGeneral Medical CenterSW  Feb 16 2023

## 2023-02-16 ENCOUNTER — VIRTUAL VISIT (OUTPATIENT)
Dept: PSYCHIATRY | Facility: CLINIC | Age: 28
End: 2023-02-16
Payer: COMMERCIAL

## 2023-02-16 ENCOUNTER — VIRTUAL VISIT (OUTPATIENT)
Dept: BEHAVIORAL HEALTH | Facility: CLINIC | Age: 28
End: 2023-02-16
Payer: COMMERCIAL

## 2023-02-16 DIAGNOSIS — F43.10 PTSD (POST-TRAUMATIC STRESS DISORDER): Primary | ICD-10-CM

## 2023-02-16 DIAGNOSIS — F32.81 PMDD (PREMENSTRUAL DYSPHORIC DISORDER): ICD-10-CM

## 2023-02-16 PROCEDURE — 90832 PSYTX W PT 30 MINUTES: CPT | Mod: VID | Performed by: SOCIAL WORKER

## 2023-02-16 PROCEDURE — 99214 OFFICE O/P EST MOD 30 MIN: CPT | Mod: VID | Performed by: NURSE PRACTITIONER

## 2023-02-16 RX ORDER — MIRTAZAPINE 7.5 MG/1
TABLET, FILM COATED ORAL
Qty: 90 TABLET | Refills: 1 | Status: SHIPPED | OUTPATIENT
Start: 2023-02-16

## 2023-02-16 NOTE — NURSING NOTE
Is the patient currently in the state of [place]? YES    Visit mode:VIDEO    If the visit is dropped, the patient can be reconnected by: VIDEO VISIT: Text to cell phone: [phone]    Will anyone else be joining the visit? NO      How would you like to obtain your AVS? MyChart    Are changes needed to the allergy or medication list? NO    Comments or concerns regarding today's visit: [facility]IG[facility] [employer] [facility]

## 2023-02-16 NOTE — Clinical Note
Pt is planning move to [place] next month. I have send 90 days with 1 refill of her mirtazapine to her pharmacy, and she is aware that she will need to establish with new provider in [place].   Gratefully,   ZITA Gunter, CNP, PNP-PC, PMHNP-BC  [facility] ([facility])

## 2023-02-16 NOTE — PATIENT INSTRUCTIONS
PLAN  Continue mirtazapine 7.5 mg at bedtime for sleep. Sent 90 days with 1 refill.   Discussed importance of scheduling appointment with PCP or psychiatry in [place].   Avoid mood-altering substances not prescribed to you.   Please contact me via [facility] with any non-urgent concerns or call [phone].   Call or text [phone] for mental health crisis.   Call [phone] or use ER for potentially life-threatening situations.

## 2023-10-22 ENCOUNTER — HEALTH MAINTENANCE LETTER (OUTPATIENT)
Age: 28
End: 2023-10-22

## 2024-12-15 ENCOUNTER — HEALTH MAINTENANCE LETTER (OUTPATIENT)
Age: 29
End: 2024-12-15
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
